# Patient Record
Sex: FEMALE | Race: BLACK OR AFRICAN AMERICAN
[De-identification: names, ages, dates, MRNs, and addresses within clinical notes are randomized per-mention and may not be internally consistent; named-entity substitution may affect disease eponyms.]

---

## 2019-04-23 ENCOUNTER — HOSPITAL ENCOUNTER (OUTPATIENT)
Dept: HOSPITAL 74 - JASUSAT | Age: 59
LOS: 1 days | Discharge: HOME | End: 2019-04-24
Attending: OBSTETRICS & GYNECOLOGY
Payer: COMMERCIAL

## 2019-04-23 VITALS — BODY MASS INDEX: 40.5 KG/M2

## 2019-04-23 DIAGNOSIS — C53.9: Primary | ICD-10-CM

## 2019-04-23 DIAGNOSIS — E11.9: ICD-10-CM

## 2019-04-23 DIAGNOSIS — Z79.84: ICD-10-CM

## 2019-04-23 DIAGNOSIS — I10: ICD-10-CM

## 2019-04-23 DIAGNOSIS — D25.9: ICD-10-CM

## 2019-04-23 PROCEDURE — 0UT7FZZ RESECTION OF BILATERAL FALLOPIAN TUBES, VIA NATURAL OR ARTIFICIAL OPENING WITH PERCUTANEOUS ENDOSCOPIC ASSISTANCE: ICD-10-PCS | Performed by: OBSTETRICS & GYNECOLOGY

## 2019-04-23 PROCEDURE — 0UT9FZZ RESECTION OF UTERUS, VIA NATURAL OR ARTIFICIAL OPENING WITH PERCUTANEOUS ENDOSCOPIC ASSISTANCE: ICD-10-PCS | Performed by: OBSTETRICS & GYNECOLOGY

## 2019-04-23 PROCEDURE — 58575 LAPS TOT HYST RESJ MAL: CPT

## 2019-04-23 PROCEDURE — 07BC4ZZ EXCISION OF PELVIS LYMPHATIC, PERCUTANEOUS ENDOSCOPIC APPROACH: ICD-10-PCS | Performed by: OBSTETRICS & GYNECOLOGY

## 2019-04-23 PROCEDURE — 8E0W4CZ ROBOTIC ASSISTED PROCEDURE OF TRUNK REGION, PERCUTANEOUS ENDOSCOPIC APPROACH: ICD-10-PCS | Performed by: OBSTETRICS & GYNECOLOGY

## 2019-04-23 PROCEDURE — 0UT2FZZ RESECTION OF BILATERAL OVARIES, VIA NATURAL OR ARTIFICIAL OPENING WITH PERCUTANEOUS ENDOSCOPIC ASSISTANCE: ICD-10-PCS | Performed by: OBSTETRICS & GYNECOLOGY

## 2019-04-23 RX ADMIN — KETOROLAC TROMETHAMINE SCH MG: 15 INJECTION, SOLUTION INTRAMUSCULAR; INTRAVENOUS at 15:55

## 2019-04-23 RX ADMIN — CEFAZOLIN SODIUM SCH MLS/HR: 1 INJECTION, SOLUTION INTRAVENOUS at 21:20

## 2019-04-23 RX ADMIN — KETOROLAC TROMETHAMINE SCH: 15 INJECTION, SOLUTION INTRAMUSCULAR; INTRAVENOUS at 21:53

## 2019-04-23 RX ADMIN — ACETAMINOPHEN SCH MG: 325 TABLET ORAL at 21:54

## 2019-04-23 NOTE — OP
DATE OF OPERATION:  04/23/2019

 

PREOPERATIVE DIAGNOSES:  

1.  Serous endometrial cancer.

2.  Morbid obesity.

3.  Fourteen-week fibroid uterus.

 

POSTOPERATIVE DIAGNOSES:

1.  Serous endometrial carcinoma.

2.  Morbid obesity.

3.  Fibroid uterus.

 

PROCEDURE:  Robotic-assisted total hysterectomy, bilateral salpingo-oophorectomy,

left pelvic sentinel lymph node dissection, and infracolic omentectomy and lysis of

adhesions.

 

SURGEON: Fina Lujan MD

 

ASSISTANT:  ANTONIA Mosher

 

ANESTHESIA:  General endotracheal and local.

 

ESTIMATED BLOOD LOSS:  100 mL.

 

COMPLICATIONS:  None.

 

INDICATIONS:  This is a 58-year-old with history of postmenopausal vaginal bleeding. 

Endometrial biopsy showed a serous carcinoma.  She then had a CT scan of the chest,

abdomen, and pelvis on April 5, 2019, which showed no evidence of metastatic disease;

an enlarged, lobulated, fibroid uterus.  CA-125 was normal.  The patient was

counseled regarding surgical management.  The risks, benefits, indications, and

alternatives were discussed with the patient.  All questions were answered and

informed consent was signed.

 

FINDINGS:  Cervix:  No lesions.  Vagina:  No lesions.  Intraabdominal Findings: 

Normal liver edge, normal diaphragm, normal omentum, normal appendix.  Uterus was

14-week size and lobulated, consistent with multiple fibroids.  Bilateral tubes and

ovaries appeared normal.  There were omental adhesions to the anterior abdominal wall

in the midline.  There was some lymphadenopathy in the left external iliac area,

which was sent for frozen and returned 2 benign lymph nodes.  The right sentinel

lymph nodes did not map.  They did not, however, appear enlarged.  There was no

evidence of ascites or intraabdominal disease.

 

PROCEDURE:  The patient was taken to the operating room, placed in the dorsal supine

position.  General endotracheal anesthesia was obtained without difficulty.  She was

placed in the dorsal lithotomy position in Ted stirrups and prepped and draped in

the normal sterile fashion.  

 

Emrcedes catheter was placed in the bladder, speculum was placed in the vagina.  The

cervix was gently dilated and grasped with a single-toothed tenaculum at 12 o'clock. 

Then, 4 mL of 1.25 mg/mL of indocyanine green dye was injected into the cervix at 3

and 9 o'clock superficially and a ToonTimeare uterine manipulator was then placed and

tenaculum and speculum were then removed.

 

Attention was turned to the patient's abdomen, where 5 mL of 0.25% Marcaine was

injected approximately 4 cm superior to the umbilicus and an 8-mm incision was made

with the scalpel.  While tenting the anterior abdominal wall, a Veress needle was

inserted intraabdominally and the abdomen was insufflated with CO2 gas.  An 8-mm

trocar was placed and an intraabdominal placement was confirmed by direct

visualization with the laparoscope.  Additional 8-mm trocars were placed in the left

midquadrant and right midquadrant and a 5-mm _____ port was placed in the right lower

quadrant.  All trocars were placed under direct visualization after injecting 0.25%

Marcaine.

 

A thorough examination of the abdomen and pelvis revealed the above-noted findings. 

Peritoneal washings were taken using normal saline.  The da Antoni robot was then

docked without difficulty.

 

The left adnexa was elevated and the left ureter was noted to be well-away from the

field of dissection.  The left infundibulopelvic ligament was clamped, cauterized,

and transected.  This was carried through the broad ligament and the round ligament

and the vesicouterine peritoneum anteriorly.  The left retroperitoneum was opened and

there was a large area of fluorescing with the ICG dye, consistent with the sentinel

lymph node.  These lymph nodes were bulky.  They were handed off the field and sent

for frozen section, which returned 2 benign lymph nodes.  The remainder of the

sentinel lymph nodes were handed off the field later, as they were passed through the

vagina after the hysterectomy.  The right retroperitoneum was opened and there was no

evidence of lymph node mapping.  There was a large amount of bowel precluding safe

lymph node dissection; therefore, we did not proceed, as there was an excessive

amount of bowel to contend with at this point in our operative field.  The right

infundibulopelvic ligament was identified.  The ureter was noted to be well-away from

the field of dissection.  The infundibulopelvic ligament was clamped, cauterized, and

transected.  This was carried through the broad ligament and the round ligament and

the vesicouterine peritoneum anteriorly.  The bladder was dissected off the anterior

surface of the vagina.  Uterine arteries bilaterally were skeletonized.  They were

clamped, cauterized, and transected.  The cardinal ligaments were serially clamped,

cauterized, and transected and the uterosacral ligaments were clamped, cauterized,

and transected.  A vaginotomy incision was made circumferentially around the cervix. 

Uterus, cervix, ovaries, and tubes were placed in a large EndoCatch bag and handed

off the field, intact.

 

The omentum was grasped and brought into the pelvis and, using the Intuitive vessel

sealer, an infracolic omentectomy was performed, ligating and clamping the omental

vessels.  Excellent hemostasis was seen.  The infracolic omentum was handed off the

field through the vagina.

 

The vaginal cuff was closed in running, continuous fashion using 2-0 V-Loc suture. 

The pelvis was thoroughly irrigated with saline and noted to be hemostatic.  Surgicel

was placed on the lymph node beds and on the vaginal cuff for added assurance.  All

instruments were removed from the patient's abdomen.  The da Antoni robot was then

undocked.  We again looked intraabdominally; excellent hemostasis was seen.  All

trocars were removed.  Skin was closed with 4-0 Monocryl.  Dermabond was applied. 

The vaginal cuff was irrigated with Betadine and sterile water.

 

The patient was extubated and transferred in stable condition to the PACU.

 

 

CLARITA [id number]

DD: 04/23/2019 15:18

DT: 04/23/2019 20:35

Job #:  [id number]

## 2019-04-23 NOTE — OP
Operative Note





- Note:


Operative Date: 04/23/19


Pre-Operative Diagnosis: endometrial cancer


Operation: robotic assited laparoscopic CANDELARIO/BSO with pelvic washings, lymph 

node disection and omentectomy


Post-Operative Diagnosis: Same as Pre-op


Surgeon: Fina Lujan


Assistant: Soo Mejía


Anesthesiologist/CRNA: Roselia Barraza


Anesthesia: General


Specimens Removed: uterus, b/l fallopian tubes, b/l ovaries, patrial omentum, 

pelvic lymph nodes.


Estimated Blood Loss (mls): 100


Drains, Volume Out (mls): 150 (underwood)


Fluid Volume Replaced (mls): 1,000


Operative Report Dictated: Yes

## 2019-04-24 VITALS — DIASTOLIC BLOOD PRESSURE: 68 MMHG | HEART RATE: 58 BPM | TEMPERATURE: 98.4 F | SYSTOLIC BLOOD PRESSURE: 133 MMHG

## 2019-04-24 LAB
ANION GAP SERPL CALC-SCNC: 7 MMOL/L (ref 8–16)
BUN SERPL-MCNC: 6 MG/DL (ref 7–18)
CALCIUM SERPL-MCNC: 8.9 MG/DL (ref 8.5–10.1)
CHLORIDE SERPL-SCNC: 105 MMOL/L (ref 98–107)
CO2 SERPL-SCNC: 25 MMOL/L (ref 21–32)
CREAT SERPL-MCNC: 0.7 MG/DL (ref 0.55–1.3)
DEPRECATED RDW RBC AUTO: 15.4 % (ref 11.6–15.6)
GLUCOSE SERPL-MCNC: 116 MG/DL (ref 74–106)
HCT VFR BLD CALC: 35.8 % (ref 32.4–45.2)
HGB BLD-MCNC: 11.7 GM/DL (ref 10.7–15.3)
MCH RBC QN AUTO: 27.5 PG (ref 25.7–33.7)
MCHC RBC AUTO-ENTMCNC: 32.6 G/DL (ref 32–36)
MCV RBC: 84.3 FL (ref 80–96)
PLATELET # BLD AUTO: 239 K/MM3 (ref 134–434)
PMV BLD: 9.8 FL (ref 7.5–11.1)
POTASSIUM SERPLBLD-SCNC: 4.4 MMOL/L (ref 3.5–5.1)
RBC # BLD AUTO: 4.25 M/MM3 (ref 3.6–5.2)
SODIUM SERPL-SCNC: 137 MMOL/L (ref 136–145)
WBC # BLD AUTO: 8.2 K/MM3 (ref 4–10)

## 2019-04-24 RX ADMIN — KETOROLAC TROMETHAMINE SCH: 15 INJECTION, SOLUTION INTRAMUSCULAR; INTRAVENOUS at 09:20

## 2019-04-24 RX ADMIN — ACETAMINOPHEN SCH MG: 325 TABLET ORAL at 09:45

## 2019-04-24 RX ADMIN — CEFAZOLIN SODIUM SCH MLS/HR: 1 INJECTION, SOLUTION INTRAVENOUS at 05:43

## 2019-04-24 RX ADMIN — ACETAMINOPHEN SCH MG: 325 TABLET ORAL at 09:22

## 2019-04-24 RX ADMIN — ACETAMINOPHEN SCH MG: 325 TABLET ORAL at 03:40

## 2019-04-24 RX ADMIN — KETOROLAC TROMETHAMINE SCH: 15 INJECTION, SOLUTION INTRAMUSCULAR; INTRAVENOUS at 05:52

## 2019-04-24 NOTE — PATH
Cytology Non-Gynecological Report



Patient Name:  ANNETTE ARTIS

Accession #:  

[facility]. Rec. #:  [id number]                                                        

   /Age/Gender:  1960 (Age: 58) / F

Account:  [id number]                                                          

             Location: [facility] OBS/GYN

Taken:  2019

Received:  2019

Reported:  2019

Physicians:  Fina Lujan MD

  



Specimen(s) Received

 PELVIC WASHINGS 





Clinical History

None given







Final Diagnosis

PELVIC WASHING:

SATISFACTORY FOR EVALUATION.

BENIGN (NO MALIGNANT CELLS IDENTIFIED)

HYPOCELLULAR SPECIMEN WITH BENIGN MESOTHELIAL CELLS PRESENT.



Comment: See [id number].





***Electronically Signed***

Kendall Smith M.D.





Gross Description

Approximately 30 cc of clear fluid received fresh.  Two cytofunnels and one

cellblock prepared.

## 2019-04-26 NOTE — PATH
Surgical Pathology Report



Patient Name:  ANNETTE ARTIS

Accession #:  [id number]

[facility]. Rec. #:  [id number]                                                        

   /Age/Gender:  1960 (Age: 58) / F

Account:  [id number]                                                          

             Location: AMBULATORY SURG

Taken:  2019

Received:  2019

Reported:  2019

Physicians:  Fina Lujan MD

  



Specimen(s) Received

A: LEFT PELVIC SENTINEL LYMPH NODE 

B: UTERUS AND CERVIX WITH BILATERIAL OVARIES AND TUBES 

C: LEFT EXTERNAL ILIAC SENTINEL LYMPH NODE 

D: OMENTUM 





Clinical History

Endometrial cancer





Intraoperative Consult Diagnosis

A. Left pelvic sentinel node, frozen section:

Two benign lymph nodes.



B. Uterus, frozen section: Endometrial adenocarcinoma, high nuclear grade, favor

papillary serous type. Minimally invasive into myometrium (less than 10%) on

representative frozen section. No grossly obvious cervical involvement

identified.

DEMETRIO Smith M.D., 2019









Final Diagnosis

A. LEFT PELVIC SENTINEL LYMPH NODE, EXCISION:

TWO BENIGN LYMPH NODES (0/2).



B. UTERUS AND CERVIX WITH BILATERAL FALLOPIAN TUBES AND OVARIES, HYSTERECTOMY

AND BILATERAL SALPINGO-OOPHORECTOMY:

ENDOMETRIOID ADENOCARCINOMA, FIGO GRADE 2 OF 3 (VILLOGLANDULAR PATTERN WITH HIGH

NUCLEAR GRADE), 2.8 CM IN GREATEST DIMENSION.

CARCINOMA INVADES 3 MM INTO A 30 MM THICK WALL.

NO VASCULAR INVASION IDENTIFIED.

UTERINE SEROSAL INVOLVEMENT, INVOLVEMENT OF ENDOCERVIX, OR PARAMETRIAL

INVOLVEMENT IDENTIFIED.

REMAINING UTERINE TISSUE WITH LEIOMYOMATA, ADENOMYOSIS, AND INACTIVE

ENDOMETRIUM.

CERVIX WITH CHRONIC INFLAMMATION.

RIGHT FALLOPIAN TUBE WITH ENDOMETRIOSIS AND BENIGN PARATUBAL CYST PRESENT.

UNREMARKABLE RIGHT OVARY PRESENT.

LEFT FALLOPIAN TUBE WITH FOCAL CHRONIC INFLAMMATION PRESENT.

UNREMARKABLE LEFT OVARY PRESENT.



C. LEFT EXTERNAL ILIAC LYMPH NODES, EXCISION:

THREE BENIGN LYMPH NODES (0/3).



D. OMENTUM, EXCISION:

BENIGN ADIPOSE TISSUE WITH BLOOD VESSELS CONSISTENT WITH OMENTUM.



Comment: Immunohistochemical stains performed and interpreted at [facility] show the following results: Immunohistochemical stains for

estrogen receptor and progesterone receptor on block B8 showed carcinoma to be

positive with both antibodies. Immunohistochemical stains performed and

interpreted at [facility] show the following results:

Immunohistochemical stains for p16 and p53 on block B10 show no overexpression.

The histologic and immunophenotypic findings are consistent with endometrioid

adenocarcinoma.



Immunohistochemical stains for MisMatch Repair Protein Analysis performed at

[facility] in [place], [place] ([id number]) and interpreted at [facility] show the following:



RESULTS:

HMLH-1     INTACT NUCLEAR EXPRESSION

HMSH-2     INTACT NUCLEAR EXPRESSION

HMSH-6     INTACT NUCLEAR EXPRESSION

PMS2          INTACT NUCLEAR EXPRESSION



INTERPRETATION:  No loss of nuclear expression of MMR proteins: low probability

of microsatellite instability-high (MSI-H)





Comments

Endometrial Carcinoma :Surgical Pathology Cancer Case Summary

Based on AJCC 8th edition



Procedure 

_X__ Total hysterectomy and bilateral salpingo-oophorectomy



 Tumor Size 

Greatest dimension: 2.8 cm



Histologic Type 

_X__ Endometrioid carcinoma, villoglandular variant



Histologic Grade 

_X__ FIGO grade 2



Myometrial Invasion 

_X__ Present

Depth of invasion (millimeters): 3 mm

Myometrial thickness (millimeters):30 mm

Percentage of myometrial invasion: 10%



Uterine Serosa Involvement

     _X__ Not identified



Cervical Stromal Involvement 

     _X__ Not identified

     

Other Tissue/Organ Involvement 

_X__ Not identified



Margins (required only if cervix and/or parametrium/paracervix is involved by

carcinoma) 



Ectocervical/Vaginal Cuff Margin

Uninvolved by carcinoma



Parametrial/Paracervical Margin

Uninvolved by carcinoma



Lymphovascular Invasion 

_X__ Not identified



     Regional Lymph Nodes 



Lymph Node Examination 

Pelvic Lymph Nodes



Number of Pelvic Nodes with Macrometastasis (greater than 2 mm):  0



Number of Pelvic Nodes with Micrometastasis (greater than 0.2 mm and up to 2

mm):  0



Number of Pelvic Nodes with Isolated Tumor Cells (0.2 mm or less)  0



     Total Number of Pelvic Nodes Examined (sentinel and non-sentinel): 5



Number of Pelvic Rapidan Nodes Examined: 2

     

     Laterality of Pelvic Node(s) Examined: LEFT

     

Para-aortic Lymph Nodes



_X__ No para-aortic nodes submitted or found





Pathologic Stage Classification (pTNM, AJCC 8th Edition) 



Primary Tumor (pT)

     _X__ pT1a:     Tumor limited to endometrium or invading less than half of

the myometrium 

     

Regional Lymph Nodes (pN) (select all that apply) 



     Category (pN)

     _X__ pN0:     No regional lymph node metastasis





***Electronically Signed***

Kendall Smith M.D.





Gross Description

A. Received fresh for frozen section labeled "left pelvic sentinel lymph node"

is a 2.2 x 1.5 x 0.5 cm aggregate of yellow fatty tissue containing 2 possible

lymph nodes, one of which measure 0.5 cm in greatest dimension and the other of

which measures 0.9 cm in greatest dimension. The lymph nodes are frozen and

frozen the remainder is submitted in cassette FSA.



B. Received fresh labeled "uterus, cervix, bilateral tubes and ovaries," is a

332 g uterus with an attached cervix and bilateral attached adnexa. The specimen

measures 11 cm from superior to inferior, 7.6 cm from left to right and 7.5 cm

from anterior to posterior. The serosa is tan-grey with focal bulging subserosal

nodules. The attached cervix measures 3.5 cm in length and 2.3 cm in diameter.

The ectocervix is tan, smooth and glistening. The endocervix is unremarkable.

The endometrial cavity measures 5 cm in length and 3.5 cm from cornu to cornu.

The fundus of the anterior aspect displays a 2.8 x 1.5 cm tan mass with no

grossly obvious myometrial invasion. The remaining endometrium is tan-red and

averages 0.1 cm in thickness. The myometrium displays abundant intramural

nodules, measuring up to 3.5 cm in greatest dimension. The cut surface of the

subserosal and intramural nodules is tan and rubbery with whorled architecture.

No areas of hemorrhage or necrosis are identified. The remaining myometrium is

tan and measures up to 3 cm in thickness. The left fimbriated fallopian tube

appears previously ligated and measures 4.5 cm in length. The outer surface is

tan-gray and smooth. Sectioning reveals an unremarkable lumen. The left ovary

measures 2.5 x 1.5 x 1.0 cm. The outer surface is tan-yellow, convoluted and

smooth. Sectioning reveals unremarkable ovarian parenchyma. The right fimbriated

fallopian tube appears previously ligated and measures 2 cm in length. The outer

surface is tan grey and smooth. Sectioning reveals an unremarkable lumen. The

right ovary measures 2.5 x 1.5 x 1.0 cm. The outer surface is tan, convoluted

and smooth. Sectioning reveals unremarkable ovarian parenchyma. A representative

section of the mass is submitted for frozen section. Representative sections are

submitted in 26 cassettes as follows: 1-frozen section residue; 2-anterior

cervix; 3-posterior cervix; 4-anterior lower uterine segment; 5-posterior lower

uterine segment; 6-right parametrium; 7-left parametrium; 1-43-pgngdbir

submitted anterior endometrial fundic mass sequentially from superior to

inferior; 13-uninvolved anterior endomyometrium inferior to mass;

14-15-posterior endomyometrium; 43-09-hhptvsustw nodules; 93-40-ytwsemaept

nodules; 21-left fallopian tube fimbria; 22-cross sections of left fallopian

tube; 23-left ovary; 24-right fallopian tube fimbria; 25-cross sections of right

fallopian tube; 26-right ovary.



C. Received in formalin labeled "left external iliac sentinel lymph node," are 3

lymph nodes with attached fat ranging from 1.3-1.8 cm in greatest dimension. The

lymph nodes are entirely submitted in 3 cassettes as follows: 1-3-one bisected

lymph node each.



D. Received in formalin labeled "omentum," is a 28.0 x 15.0 x 1.5 cm aggregate

of yellow, lobulated adipose tissue, consistent with omentum. No discrete

lesions are identified. Representative sections are submitted in 5 cassettes.



[facility]/2019



[facility]/2019

## 2019-12-02 ENCOUNTER — HOSPITAL ENCOUNTER (OUTPATIENT)
Dept: HOSPITAL 74 - JRADIR | Age: 59
Discharge: HOME | End: 2019-12-02
Attending: INTERNAL MEDICINE
Payer: COMMERCIAL

## 2019-12-02 DIAGNOSIS — E04.1: Primary | ICD-10-CM

## 2019-12-02 PROCEDURE — 0G9G3ZX DRAINAGE OF LEFT THYROID GLAND LOBE, PERCUTANEOUS APPROACH, DIAGNOSTIC: ICD-10-PCS | Performed by: RADIOLOGY

## 2019-12-04 NOTE — PATH
Cytology Non-Gynecological Report



Patient Name:  ANNETTE ARTIS

Accession #:  

[facility]. Rec. #:  [id number]                                                        

   /Age/Gender:  1960 (Age: 59) / F

Account:  [id number]                                                          

             Location: RADIOLOGY INTER

Taken:  2019

Received:  2019

Reported:  2019

Physicians:  CLARITA Varela M.D.

  



Specimen(s) Received

 LEFT THYROID FNA 





Clinical History

Left thyroid nodule







Final Diagnosis

THYROID, LEFT, FINE NEEDLE ASPIRATION:

UNSATISFACTORY FOR EVALUATION.

BETHESDA CLASS I: NON-DIAGNOSTIC 

RARE FOLLICULAR CELLS AND SCANT COLLOID PRESENT.



Comment: If ultrasound findings are suspicious, repeat biopsy is recommended.

Clinical correlation is necessary.







***Electronically Signed***

Merly Clay M.D.





Gross Description

Received are eight direct smears, four of which are air-dried and Diff-Quik

stained, and four of which are alcohol fixed and Pap stained.  Also received is

20 ml of bloody formalin from which one cellblock is prepared. In

## 2019-12-09 ENCOUNTER — HOSPITAL ENCOUNTER (OUTPATIENT)
Dept: HOSPITAL 74 - JRADIR | Age: 59
Discharge: HOME | End: 2019-12-09
Attending: INTERNAL MEDICINE
Payer: COMMERCIAL

## 2019-12-09 DIAGNOSIS — E04.1: Primary | ICD-10-CM

## 2019-12-09 PROCEDURE — 0G9K3ZX DRAINAGE OF THYROID GLAND, PERCUTANEOUS APPROACH, DIAGNOSTIC: ICD-10-PCS

## 2019-12-10 NOTE — PATH
Cytology Non-Gynecological Report



Patient Name:  ANNETTE ARTIS

Accession #:  

Med. Rec. #:  [id number]                                                        

   /Age/Gender:  1960 (Age: 59) / F

Account:  [id number]                                                          

             Location: RADIOLOGY INTER

Taken:  2019

Received:  2019

Reported:  12/10/2019

Physicians:  CLARITA Marlow M.D.

  



Specimen(s) Received

 THYROID, RIGHT LOBE, FINE NEEDLE ASPIRATION 





Clinical History

Right lobe, 2.59 x 1.5 x 1.51 cm







Final Diagnosis

THYROID, RIGHT LOBE, FINE NEEDLE ASPIRATION:

SATISFACTORY FOR EVALUATION.

BETHESDA CLASS II: BENIGN.

CYTOLOGIC FINDINGS ARE CONSISTENT WITH A BENIGN FOLLICULAR NODULE WITH

POST-HEMORRHAGIC CHANGE. 

SMALL FOLLICULAR CELLS IN A BACKGROUND OF ABUNDANT COLLOID AND SCATTERED

HEMOSIDERIN-LADEN MACROPHAGES PRESENT.





***Electronically Signed***

Nolvia Guerra M.D.





Gross Description

Received are eight direct smears, four of which are air-dried and Diff-Quik

stained, and four of which are alcohol fixed and Pap stained.  Also received is

20 ml of bloody formalin from which one cellblock is prepared.

## 2019-12-16 ENCOUNTER — HOSPITAL ENCOUNTER (OUTPATIENT)
Dept: HOSPITAL 74 - JRADIR | Age: 59
Discharge: HOME | End: 2019-12-16
Attending: INTERNAL MEDICINE
Payer: COMMERCIAL

## 2019-12-16 DIAGNOSIS — E04.1: Primary | ICD-10-CM

## 2019-12-16 PROCEDURE — 0G9K3ZX DRAINAGE OF THYROID GLAND, PERCUTANEOUS APPROACH, DIAGNOSTIC: ICD-10-PCS | Performed by: RADIOLOGY

## 2019-12-18 NOTE — PATH
Cytology Non-Gynecological Report



Patient Name:  ANNETTE ARTIS

Accession #:  

[facility]. Rec. #:  [id number]                                                        

   /Age/Gender:  1960 (Age: 59) / F

Account:  [id number]                                                          

             Location: RADIOLOGY INTER

Taken:  2019

Received:  2019

Reported:  2019

Physicians:  CLARITA Varela M.D.

  



Specimen(s) Received

 THYROID, ISTHMUS, FINE NEEDLE ASPIRATION 





Clinical History

Isthmus, 1.01 x 0.92 x 1.48 cm







Final Diagnosis

THYROID, ISTHMUS, FINE NEEDLE ASPIRATION:

SATISFACTORY FOR EVALUATION.

BETHESDA CLASS II: BENIGN.

CYTOLOGIC FINDINGS ARE CONSISTENT WITH A BENIGN FOLLICULAR NODULE. 

SMALL FOLLICULAR CELLS AND ABUNDANT THIN COLLOID PRESENT.





***Electronically Signed***

Nolvia Guerra M.D.





Gross Description

Received are eight direct smears, four of which are air-dried and Diff-Quik

stained, and four of which are alcohol fixed and Pap stained.  Also received is

20 ml of bloody formalin from which one cellblock is prepared.

## 2021-05-03 ENCOUNTER — HOSPITAL ENCOUNTER (OUTPATIENT)
Dept: HOSPITAL 74 - JER | Age: 61
Setting detail: OBSERVATION
LOS: 1 days | Discharge: HOME | End: 2021-05-04
Attending: NURSE PRACTITIONER | Admitting: INTERNAL MEDICINE
Payer: COMMERCIAL

## 2021-05-03 VITALS — BODY MASS INDEX: 41.9 KG/M2

## 2021-05-03 DIAGNOSIS — Z88.8: ICD-10-CM

## 2021-05-03 DIAGNOSIS — R07.9: ICD-10-CM

## 2021-05-03 DIAGNOSIS — R79.89: ICD-10-CM

## 2021-05-03 DIAGNOSIS — E78.5: ICD-10-CM

## 2021-05-03 DIAGNOSIS — E11.9: ICD-10-CM

## 2021-05-03 DIAGNOSIS — Z85.89: ICD-10-CM

## 2021-05-03 DIAGNOSIS — E66.01: ICD-10-CM

## 2021-05-03 DIAGNOSIS — Z98.890: ICD-10-CM

## 2021-05-03 DIAGNOSIS — Z29.9: ICD-10-CM

## 2021-05-03 DIAGNOSIS — Z87.891: ICD-10-CM

## 2021-05-03 DIAGNOSIS — R25.2: ICD-10-CM

## 2021-05-03 DIAGNOSIS — I11.9: Primary | ICD-10-CM

## 2021-05-03 LAB
ALBUMIN SERPL-MCNC: 3.8 G/DL (ref 3.4–5)
ALP SERPL-CCNC: 149 U/L (ref 45–117)
ALT SERPL-CCNC: 17 U/L (ref 13–61)
ANION GAP SERPL CALC-SCNC: 7 MMOL/L (ref 8–16)
APTT BLD: 29.9 SECONDS (ref 25.2–36.5)
AST SERPL-CCNC: 15 U/L (ref 15–37)
BASOPHILS # BLD: 1.1 % (ref 0–2)
BILIRUB SERPL-MCNC: 0.2 MG/DL (ref 0.2–1)
BUN SERPL-MCNC: 11.6 MG/DL (ref 7–18)
CALCIUM SERPL-MCNC: 9.2 MG/DL (ref 8.5–10.1)
CHLORIDE SERPL-SCNC: 106 MMOL/L (ref 98–107)
CO2 SERPL-SCNC: 25 MMOL/L (ref 21–32)
CREAT SERPL-MCNC: 0.8 MG/DL (ref 0.55–1.3)
DEPRECATED RDW RBC AUTO: 15.8 % (ref 11.6–15.6)
EOSINOPHIL # BLD: 1.8 % (ref 0–4.5)
GLUCOSE SERPL-MCNC: 133 MG/DL (ref 74–106)
HCT VFR BLD CALC: 39.4 % (ref 32.4–45.2)
HGB BLD-MCNC: 12.9 GM/DL (ref 10.7–15.3)
INR BLD: 0.87 (ref 0.83–1.09)
LYMPHOCYTES # BLD: 31.4 % (ref 8–40)
MCH RBC QN AUTO: 27.2 PG (ref 25.7–33.7)
MCHC RBC AUTO-ENTMCNC: 32.8 G/DL (ref 32–36)
MCV RBC: 82.9 FL (ref 80–96)
MONOCYTES # BLD AUTO: 7.2 % (ref 3.8–10.2)
NEUTROPHILS # BLD: 58.5 % (ref 42.8–82.8)
PLATELET # BLD AUTO: 264 K/MM3 (ref 134–434)
PMV BLD: 10.1 FL (ref 7.5–11.1)
PROT SERPL-MCNC: 7.4 G/DL (ref 6.4–8.2)
PT PNL PPP: 10.6 SEC (ref 9.7–13)
RBC # BLD AUTO: 4.76 M/MM3 (ref 3.6–5.2)
SODIUM SERPL-SCNC: 139 MMOL/L (ref 136–145)
WBC # BLD AUTO: 5.2 K/MM3 (ref 4–10)

## 2021-05-03 PROCEDURE — U0005 INFEC AGEN DETEC AMPLI PROBE: HCPCS

## 2021-05-03 PROCEDURE — U0003 INFECTIOUS AGENT DETECTION BY NUCLEIC ACID (DNA OR RNA); SEVERE ACUTE RESPIRATORY SYNDROME CORONAVIRUS 2 (SARS-COV-2) (CORONAVIRUS DISEASE [COVID-19]), AMPLIFIED PROBE TECHNIQUE, MAKING USE OF HIGH THROUGHPUT TECHNOLOGIES AS DESCRIBED BY CMS-2020-01-R: HCPCS

## 2021-05-03 PROCEDURE — C9803 HOPD COVID-19 SPEC COLLECT: HCPCS

## 2021-05-03 PROCEDURE — G0378 HOSPITAL OBSERVATION PER HR: HCPCS

## 2021-05-03 RX ADMIN — PANTOPRAZOLE SODIUM SCH MG: 40 INJECTION, POWDER, FOR SOLUTION INTRAVENOUS at 20:13

## 2021-05-04 VITALS — HEART RATE: 51 BPM | SYSTOLIC BLOOD PRESSURE: 125 MMHG | DIASTOLIC BLOOD PRESSURE: 68 MMHG

## 2021-05-04 VITALS — TEMPERATURE: 98.2 F

## 2021-05-04 LAB
ALBUMIN SERPL-MCNC: 3.4 G/DL (ref 3.4–5)
ALP SERPL-CCNC: 131 U/L (ref 45–117)
ALT SERPL-CCNC: 16 U/L (ref 13–61)
AMPHET UR-MCNC: NEGATIVE NG/ML
ANION GAP SERPL CALC-SCNC: 12 MMOL/L (ref 8–16)
AST SERPL-CCNC: 8 U/L (ref 15–37)
BARBITURATES UR-MCNC: NEGATIVE NG/ML
BASOPHILS # BLD: 0.6 % (ref 0–2)
BENZODIAZ UR SCN-MCNC: NEGATIVE NG/ML
BILIRUB SERPL-MCNC: 0.2 MG/DL (ref 0.2–1)
BUN SERPL-MCNC: 14.8 MG/DL (ref 7–18)
CALCIUM SERPL-MCNC: 8.8 MG/DL (ref 8.5–10.1)
CHLORIDE SERPL-SCNC: 106 MMOL/L (ref 98–107)
CO2 SERPL-SCNC: 21 MMOL/L (ref 21–32)
COCAINE UR-MCNC: NEGATIVE NG/ML
CREAT SERPL-MCNC: 0.9 MG/DL (ref 0.55–1.3)
DEPRECATED RDW RBC AUTO: 15.8 % (ref 11.6–15.6)
EOSINOPHIL # BLD: 0 % (ref 0–4.5)
GLUCOSE SERPL-MCNC: 260 MG/DL (ref 74–106)
HCT VFR BLD CALC: 39.6 % (ref 32.4–45.2)
HGB BLD-MCNC: 12.9 GM/DL (ref 10.7–15.3)
LYMPHOCYTES # BLD: 16.7 % (ref 8–40)
MCH RBC QN AUTO: 27.4 PG (ref 25.7–33.7)
MCHC RBC AUTO-ENTMCNC: 32.6 G/DL (ref 32–36)
MCV RBC: 84.2 FL (ref 80–96)
METHADONE UR-MCNC: NEGATIVE NG/ML
MONOCYTES # BLD AUTO: 5.9 % (ref 3.8–10.2)
NEUTROPHILS # BLD: 76.8 % (ref 42.8–82.8)
OPIATES UR QL SCN: NEGATIVE NG/ML
PCP UR QL SCN: NEGATIVE NG/ML
PLATELET # BLD AUTO: 252 K/MM3 (ref 134–434)
PMV BLD: 10.6 FL (ref 7.5–11.1)
PROT SERPL-MCNC: 6.9 G/DL (ref 6.4–8.2)
RBC # BLD AUTO: 4.7 M/MM3 (ref 3.6–5.2)
SODIUM SERPL-SCNC: 138 MMOL/L (ref 136–145)
WBC # BLD AUTO: 5.6 K/MM3 (ref 4–10)

## 2021-05-04 PROCEDURE — 3E023GC INTRODUCTION OF OTHER THERAPEUTIC SUBSTANCE INTO MUSCLE, PERCUTANEOUS APPROACH: ICD-10-PCS | Performed by: NURSE PRACTITIONER

## 2021-05-04 PROCEDURE — 3E033GC INTRODUCTION OF OTHER THERAPEUTIC SUBSTANCE INTO PERIPHERAL VEIN, PERCUTANEOUS APPROACH: ICD-10-PCS | Performed by: NURSE PRACTITIONER

## 2021-05-04 RX ADMIN — PANTOPRAZOLE SODIUM SCH MG: 40 INJECTION, POWDER, FOR SOLUTION INTRAVENOUS at 09:58

## 2022-11-14 ENCOUNTER — HOSPITAL ENCOUNTER (OUTPATIENT)
Dept: HOSPITAL 74 - JRADIR | Age: 62
Discharge: HOME | End: 2022-11-14
Attending: INTERNAL MEDICINE
Payer: COMMERCIAL

## 2022-11-14 DIAGNOSIS — E04.1: Primary | ICD-10-CM

## 2022-11-14 PROCEDURE — 0G9G3ZX DRAINAGE OF LEFT THYROID GLAND LOBE, PERCUTANEOUS APPROACH, DIAGNOSTIC: ICD-10-PCS | Performed by: RADIOLOGY

## 2024-09-19 ENCOUNTER — HOSPITAL ENCOUNTER (EMERGENCY)
Dept: HOSPITAL 74 - JERFT | Age: 64
Discharge: ANOTHER HOSPITAL | End: 2024-09-19
Payer: COMMERCIAL

## 2024-09-19 VITALS — DIASTOLIC BLOOD PRESSURE: 61 MMHG | SYSTOLIC BLOOD PRESSURE: 169 MMHG | HEART RATE: 49 BPM | RESPIRATION RATE: 20 BRPM

## 2024-09-19 VITALS — BODY MASS INDEX: 35.9 KG/M2

## 2024-09-19 VITALS — TEMPERATURE: 98.3 F

## 2024-09-19 DIAGNOSIS — Z20.822: ICD-10-CM
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
## 2024-09-19 DIAGNOSIS — T23.222A: Primary | ICD-10-CM

## 2025-06-18 ENCOUNTER — HOSPITAL ENCOUNTER (OUTPATIENT)
Dept: HOSPITAL 74 - JRADIR | Age: 65
Discharge: HOME | End: 2025-06-18
Attending: INTERNAL MEDICINE
Payer: COMMERCIAL

## 2025-06-18 DIAGNOSIS — E04.1: Primary | ICD-10-CM

## 2025-06-18 PROCEDURE — 0G9G3ZX DRAINAGE OF LEFT THYROID GLAND LOBE, PERCUTANEOUS APPROACH, DIAGNOSTIC: ICD-10-PCS
